# Patient Record
Sex: FEMALE | Race: WHITE | NOT HISPANIC OR LATINO | URBAN - METROPOLITAN AREA
[De-identification: names, ages, dates, MRNs, and addresses within clinical notes are randomized per-mention and may not be internally consistent; named-entity substitution may affect disease eponyms.]

---

## 2019-06-15 ENCOUNTER — INPATIENT (INPATIENT)
Facility: HOSPITAL | Age: 59
LOS: 1 days | Discharge: HOME | End: 2019-06-17
Attending: INTERNAL MEDICINE | Admitting: INTERNAL MEDICINE
Payer: COMMERCIAL

## 2019-06-15 VITALS
HEART RATE: 82 BPM | SYSTOLIC BLOOD PRESSURE: 138 MMHG | TEMPERATURE: 99 F | DIASTOLIC BLOOD PRESSURE: 98 MMHG | RESPIRATION RATE: 16 BRPM | OXYGEN SATURATION: 95 %

## 2019-06-15 DIAGNOSIS — I26.99 OTHER PULMONARY EMBOLISM WITHOUT ACUTE COR PULMONALE: ICD-10-CM

## 2019-06-15 LAB
ALBUMIN SERPL ELPH-MCNC: 4.7 G/DL — SIGNIFICANT CHANGE UP (ref 3.3–5)
ALP SERPL-CCNC: 76 U/L — SIGNIFICANT CHANGE UP (ref 40–120)
ALT FLD-CCNC: 14 U/L — SIGNIFICANT CHANGE UP (ref 4–33)
ANION GAP SERPL CALC-SCNC: 15 MMO/L — HIGH (ref 7–14)
APTT BLD: 27.5 SEC — SIGNIFICANT CHANGE UP (ref 27.5–36.3)
AST SERPL-CCNC: 16 U/L — SIGNIFICANT CHANGE UP (ref 4–32)
BASOPHILS # BLD AUTO: 0.03 K/UL — SIGNIFICANT CHANGE UP (ref 0–0.2)
BASOPHILS NFR BLD AUTO: 0.4 % — SIGNIFICANT CHANGE UP (ref 0–2)
BILIRUB SERPL-MCNC: 0.6 MG/DL — SIGNIFICANT CHANGE UP (ref 0.2–1.2)
BUN SERPL-MCNC: 10 MG/DL — SIGNIFICANT CHANGE UP (ref 7–23)
CALCIUM SERPL-MCNC: 10.3 MG/DL — SIGNIFICANT CHANGE UP (ref 8.4–10.5)
CHLORIDE SERPL-SCNC: 102 MMOL/L — SIGNIFICANT CHANGE UP (ref 98–107)
CO2 SERPL-SCNC: 24 MMOL/L — SIGNIFICANT CHANGE UP (ref 22–31)
CREAT SERPL-MCNC: 0.46 MG/DL — LOW (ref 0.5–1.3)
D DIMER BLD IA.RAPID-MCNC: 2888 NG/ML — SIGNIFICANT CHANGE UP
EOSINOPHIL # BLD AUTO: 0.1 K/UL — SIGNIFICANT CHANGE UP (ref 0–0.5)
EOSINOPHIL NFR BLD AUTO: 1.3 % — SIGNIFICANT CHANGE UP (ref 0–6)
GLUCOSE SERPL-MCNC: 124 MG/DL — HIGH (ref 70–99)
HCT VFR BLD CALC: 42.1 % — SIGNIFICANT CHANGE UP (ref 34.5–45)
HGB BLD-MCNC: 13.6 G/DL — SIGNIFICANT CHANGE UP (ref 11.5–15.5)
IMM GRANULOCYTES NFR BLD AUTO: 0.3 % — SIGNIFICANT CHANGE UP (ref 0–1.5)
INR BLD: 1.07 — SIGNIFICANT CHANGE UP (ref 0.88–1.17)
LYMPHOCYTES # BLD AUTO: 1.62 K/UL — SIGNIFICANT CHANGE UP (ref 1–3.3)
LYMPHOCYTES # BLD AUTO: 20.8 % — SIGNIFICANT CHANGE UP (ref 13–44)
MCHC RBC-ENTMCNC: 26.4 PG — LOW (ref 27–34)
MCHC RBC-ENTMCNC: 32.3 % — SIGNIFICANT CHANGE UP (ref 32–36)
MCV RBC AUTO: 81.6 FL — SIGNIFICANT CHANGE UP (ref 80–100)
MONOCYTES # BLD AUTO: 0.48 K/UL — SIGNIFICANT CHANGE UP (ref 0–0.9)
MONOCYTES NFR BLD AUTO: 6.2 % — SIGNIFICANT CHANGE UP (ref 2–14)
NEUTROPHILS # BLD AUTO: 5.53 K/UL — SIGNIFICANT CHANGE UP (ref 1.8–7.4)
NEUTROPHILS NFR BLD AUTO: 71 % — SIGNIFICANT CHANGE UP (ref 43–77)
NRBC # FLD: 0 K/UL — SIGNIFICANT CHANGE UP (ref 0–0)
NT-PROBNP SERPL-SCNC: 51.95 PG/ML — SIGNIFICANT CHANGE UP
PLATELET # BLD AUTO: 179 K/UL — SIGNIFICANT CHANGE UP (ref 150–400)
PMV BLD: 10 FL — SIGNIFICANT CHANGE UP (ref 7–13)
POTASSIUM SERPL-MCNC: 4.2 MMOL/L — SIGNIFICANT CHANGE UP (ref 3.5–5.3)
POTASSIUM SERPL-SCNC: 4.2 MMOL/L — SIGNIFICANT CHANGE UP (ref 3.5–5.3)
PROT SERPL-MCNC: 8.5 G/DL — HIGH (ref 6–8.3)
PROTHROM AB SERPL-ACNC: 12.2 SEC — SIGNIFICANT CHANGE UP (ref 9.8–13.1)
RBC # BLD: 5.16 M/UL — SIGNIFICANT CHANGE UP (ref 3.8–5.2)
RBC # FLD: 13.2 % — SIGNIFICANT CHANGE UP (ref 10.3–14.5)
SODIUM SERPL-SCNC: 141 MMOL/L — SIGNIFICANT CHANGE UP (ref 135–145)
TROPONIN T, HIGH SENSITIVITY: < 6 NG/L — SIGNIFICANT CHANGE UP (ref ?–14)
WBC # BLD: 7.78 K/UL — SIGNIFICANT CHANGE UP (ref 3.8–10.5)
WBC # FLD AUTO: 7.78 K/UL — SIGNIFICANT CHANGE UP (ref 3.8–10.5)

## 2019-06-15 PROCEDURE — 93971 EXTREMITY STUDY: CPT | Mod: 26,LT

## 2019-06-15 PROCEDURE — 71046 X-RAY EXAM CHEST 2 VIEWS: CPT | Mod: 26

## 2019-06-15 PROCEDURE — 71275 CT ANGIOGRAPHY CHEST: CPT | Mod: 26

## 2019-06-15 RX ORDER — HEPARIN SODIUM 5000 [USP'U]/ML
6500 INJECTION INTRAVENOUS; SUBCUTANEOUS ONCE
Refills: 0 | Status: COMPLETED | OUTPATIENT
Start: 2019-06-15 | End: 2019-06-15

## 2019-06-15 RX ORDER — HEPARIN SODIUM 5000 [USP'U]/ML
INJECTION INTRAVENOUS; SUBCUTANEOUS
Qty: 25000 | Refills: 0 | Status: DISCONTINUED | OUTPATIENT
Start: 2019-06-15 | End: 2019-06-17

## 2019-06-15 RX ORDER — ASPIRIN/CALCIUM CARB/MAGNESIUM 324 MG
162 TABLET ORAL ONCE
Refills: 0 | Status: COMPLETED | OUTPATIENT
Start: 2019-06-15 | End: 2019-06-15

## 2019-06-15 RX ORDER — KETOROLAC TROMETHAMINE 30 MG/ML
15 SYRINGE (ML) INJECTION ONCE
Refills: 0 | Status: DISCONTINUED | OUTPATIENT
Start: 2019-06-15 | End: 2019-06-15

## 2019-06-15 RX ORDER — HEPARIN SODIUM 5000 [USP'U]/ML
3000 INJECTION INTRAVENOUS; SUBCUTANEOUS EVERY 6 HOURS
Refills: 0 | Status: DISCONTINUED | OUTPATIENT
Start: 2019-06-15 | End: 2019-06-17

## 2019-06-15 RX ORDER — HEPARIN SODIUM 5000 [USP'U]/ML
6500 INJECTION INTRAVENOUS; SUBCUTANEOUS EVERY 6 HOURS
Refills: 0 | Status: DISCONTINUED | OUTPATIENT
Start: 2019-06-15 | End: 2019-06-17

## 2019-06-15 RX ADMIN — Medication 162 MILLIGRAM(S): at 12:28

## 2019-06-15 RX ADMIN — Medication 15 MILLIGRAM(S): at 16:43

## 2019-06-15 RX ADMIN — HEPARIN SODIUM 6500 UNIT(S): 5000 INJECTION INTRAVENOUS; SUBCUTANEOUS at 18:11

## 2019-06-15 RX ADMIN — HEPARIN SODIUM 1500 UNIT(S)/HR: 5000 INJECTION INTRAVENOUS; SUBCUTANEOUS at 18:12

## 2019-06-15 RX ADMIN — Medication 15 MILLIGRAM(S): at 13:52

## 2019-06-15 NOTE — ED ADULT NURSE REASSESSMENT NOTE - NS ED NURSE REASSESS COMMENT FT1
Pt. is alert and oriented x 4 no c/o pain no respiratory distress. Heparin iv infusing at 15ml/hr as ordered, will continue to monitor.

## 2019-06-15 NOTE — ED PROVIDER NOTE - ATTENDING CONTRIBUTION TO CARE
I, Jennifer Cabot, MD, have performed a history and physical exam of the patient and discussed their management with the resident. I reviewed the resident's note and agree with the documented findings and plan of care. My medical decision making and observations are found above.    Cabot: 59F with no PMH who comes in with 2d of R shoulder to L shoulder pain/numbness, 1 day of pleuritic chest pain, SOB, fatigue.  Had a plane flight from [facility] 2 weeks ago.  Denies F/C/N/V/D/urinary sx/cough/sputum.  on exam, HDS, NAD, MMM, eyes clear, lungs CTAB, heart sounds normal, abd soft, NT, ND, no CVAT, LEs without edema, wwp, skin normal temperature and color, neuro: alert and oriented, no focal deficits, radial pulses 2+ bilat.  Will check basic labs, trops, dimer, CXR, give ASA, reassess. I, Jennifer Cabot, MD, have performed a history and physical exam of the patient and discussed their management with the resident. I reviewed the resident's note and agree with the documented findings and plan of care. My medical decision making and observations are found above.    Cabot: 59F with no PMH who comes in with 2d of R shoulder to L shoulder pain/numbness, 1 day of pleuritic chest pain, SOB, fatigue.  Had a plane flight from [facility] 2 weeks ago.  Denies F/C/N/V/D/urinary sx/cough/sputum.  On exam, HDS, NAD, MMM, eyes clear, + TTP at the bilat trapezius muscles, no midline spine tenderness, lungs CTAB, heart sounds normal, abd soft, NT, ND, no CVAT, LEs without edema, wwp, skin normal temperature and color, neuro: alert and oriented, 5/5 strength, SILT, no focal deficits, radial pulses 2+ bilat, phalen and tinels tests normal.  Will check basic labs, trops, dimer, CXR, give ASA, reassess.

## 2019-06-15 NOTE — ED PROVIDER NOTE - PHYSICAL EXAMINATION
On exam, HDS, NAD, MMM, eyes clear, lungs CTAB, heart sounds normal, abd soft, NT, ND, no CVAT, LEs without edema, wwp, skin normal temperature and color, neuro: alert and oriented, no focal deficits, radial pulses 2+ bilat. On exam, HDS, NAD, MMM, eyes clear, + TTP at the bilat trapezius muscles, no midline spine tenderness, lungs CTAB, heart sounds normal, abd soft, NT, ND, no CVAT, LEs without edema, wwp, skin normal temperature and color, neuro: alert and oriented, 5/5 strength, SILT, no focal deficits, radial pulses 2+ bilat, phalen and tinels tests normal

## 2019-06-15 NOTE — ED PROVIDER NOTE - CLINICAL SUMMARY MEDICAL DECISION MAKING FREE TEXT BOX
Cabot: 59F with no PMH who comes in with 2d of R shoulder to L shoulder pain/numbness, 1 day of pleuritic chest pain, SOB, fatigue.  Had a plane flight from [facility] 2 weeks ago.  Denies F/C/N/V/D/urinary sx/cough/sputum.  on exam, HDS, NAD, MMM, eyes clear, lungs CTAB, heart sounds normal, abd soft, NT, ND, no CVAT, LEs without edema, wwp, skin normal temperature and color, neuro: alert and oriented, no focal deficits, radial pulses 2+ bilat.  Will check basic labs, trops, dimer, CXR, give ASA, reassess. Cabot: 59F with no PMH who comes in with 2d of R shoulder to L shoulder pain/numbness, 1 day of pleuritic chest pain, SOB, fatigue.  Had a plane flight from [facility] 2 weeks ago.  Denies F/C/N/V/D/urinary sx/cough/sputum.  On exam, HDS, NAD, MMM, eyes clear, + TTP at the bilat trapezius muscles, no midline spine tenderness, lungs CTAB, heart sounds normal, abd soft, NT, ND, no CVAT, LEs without edema, wwp, skin normal temperature and color, neuro: alert and oriented, 5/5 strength, SILT, no focal deficits, radial pulses 2+ bilat, phalen and tinels tests normal.  Will check basic labs, trops, dimer, CXR, give ASA, reassess.

## 2019-06-15 NOTE — ED PROVIDER NOTE - OBJECTIVE STATEMENT
Cabot: 59F with no PMH who comes in with 2 days of R shoulder to L shoulder pain/numbness, 1 day of pleuritic chest pain, SOB, fatigue.  Had a plane flight from [facility] 2 weeks ago.  Denies F/C/N/V/D/urinary sx/cough/sputum.  No smoking or drugs.  No FH of early heart disease.  No prior cardiac workup.

## 2019-06-15 NOTE — ED ADULT NURSE NOTE - OBJECTIVE STATEMENT
Receive pt. in room 1 alert and oriented x 4 presenting to the ER with complaints of heart palpitations, left arm pain, upper back pain. Pt. has no significant medical history and speaks Malay, son Brent at bedside and provided translation. Brent stated " my mother is here from [facility] for my wedding and for the past three days she said that she has palpitations, pain in her upper back  and left arm". Patient has no complaints of chest pain or heart palpitations at this time. Placed on cardiac monitor medicated as ordered, labs sent will continue to monitor.

## 2019-06-15 NOTE — ED ADULT TRIAGE NOTE - CHIEF COMPLAINT QUOTE
Pt from [place] 2weeks ago for wedding. Pt c/o pain in lt arm and leg then it radiated to rt arm and shoulder blades with SOB. Pt c/o numbness in lt hand that began this AM.  Pt complaining of palpitations but only complaints of shoulder pain denies chest pain. Pt denies any medical problems-but states she is very weak only up 1/2 the day before she has to rest

## 2019-06-16 DIAGNOSIS — E11.9 TYPE 2 DIABETES MELLITUS WITHOUT COMPLICATIONS: ICD-10-CM

## 2019-06-16 DIAGNOSIS — Z29.9 ENCOUNTER FOR PROPHYLACTIC MEASURES, UNSPECIFIED: ICD-10-CM

## 2019-06-16 DIAGNOSIS — I82.432 ACUTE EMBOLISM AND THROMBOSIS OF LEFT POPLITEAL VEIN: ICD-10-CM

## 2019-06-16 DIAGNOSIS — I26.99 OTHER PULMONARY EMBOLISM WITHOUT ACUTE COR PULMONALE: ICD-10-CM

## 2019-06-16 DIAGNOSIS — R09.89 OTHER SPECIFIED SYMPTOMS AND SIGNS INVOLVING THE CIRCULATORY AND RESPIRATORY SYSTEMS: ICD-10-CM

## 2019-06-16 DIAGNOSIS — R51 HEADACHE: ICD-10-CM

## 2019-06-16 LAB
ANION GAP SERPL CALC-SCNC: 13 MMO/L — SIGNIFICANT CHANGE UP (ref 7–14)
APTT BLD: 103.6 SEC — HIGH (ref 27.5–36.3)
APTT BLD: 125.7 SEC — CRITICAL HIGH (ref 27.5–36.3)
APTT BLD: 58.5 SEC — HIGH (ref 27.5–36.3)
APTT BLD: 60.7 SEC — HIGH (ref 27.5–36.3)
BUN SERPL-MCNC: 16 MG/DL — SIGNIFICANT CHANGE UP (ref 7–23)
CALCIUM SERPL-MCNC: 9.1 MG/DL — SIGNIFICANT CHANGE UP (ref 8.4–10.5)
CHLORIDE SERPL-SCNC: 103 MMOL/L — SIGNIFICANT CHANGE UP (ref 98–107)
CHOLEST SERPL-MCNC: 196 MG/DL — SIGNIFICANT CHANGE UP (ref 120–199)
CO2 SERPL-SCNC: 23 MMOL/L — SIGNIFICANT CHANGE UP (ref 22–31)
CREAT SERPL-MCNC: 0.53 MG/DL — SIGNIFICANT CHANGE UP (ref 0.5–1.3)
GLUCOSE SERPL-MCNC: 121 MG/DL — HIGH (ref 70–99)
HBA1C BLD-MCNC: 6.5 % — HIGH (ref 4–5.6)
HCT VFR BLD CALC: 37.7 % — SIGNIFICANT CHANGE UP (ref 34.5–45)
HCT VFR BLD CALC: 38.5 % — SIGNIFICANT CHANGE UP (ref 34.5–45)
HCV AB S/CO SERPL IA: 0.17 S/CO — SIGNIFICANT CHANGE UP (ref 0–0.99)
HCV AB SERPL-IMP: SIGNIFICANT CHANGE UP
HDLC SERPL-MCNC: 48 MG/DL — SIGNIFICANT CHANGE UP (ref 45–65)
HGB BLD-MCNC: 12.2 G/DL — SIGNIFICANT CHANGE UP (ref 11.5–15.5)
HGB BLD-MCNC: 12.5 G/DL — SIGNIFICANT CHANGE UP (ref 11.5–15.5)
LIPID PNL WITH DIRECT LDL SERPL: 140 MG/DL — SIGNIFICANT CHANGE UP
MAGNESIUM SERPL-MCNC: 2.2 MG/DL — SIGNIFICANT CHANGE UP (ref 1.6–2.6)
MCHC RBC-ENTMCNC: 26.9 PG — LOW (ref 27–34)
MCHC RBC-ENTMCNC: 27.2 PG — SIGNIFICANT CHANGE UP (ref 27–34)
MCHC RBC-ENTMCNC: 32.4 % — SIGNIFICANT CHANGE UP (ref 32–36)
MCHC RBC-ENTMCNC: 32.5 % — SIGNIFICANT CHANGE UP (ref 32–36)
MCV RBC AUTO: 83.2 FL — SIGNIFICANT CHANGE UP (ref 80–100)
MCV RBC AUTO: 83.7 FL — SIGNIFICANT CHANGE UP (ref 80–100)
NRBC # FLD: 0 K/UL — SIGNIFICANT CHANGE UP (ref 0–0)
NRBC # FLD: 0 K/UL — SIGNIFICANT CHANGE UP (ref 0–0)
PHOSPHATE SERPL-MCNC: 4.3 MG/DL — SIGNIFICANT CHANGE UP (ref 2.5–4.5)
PLATELET # BLD AUTO: 160 K/UL — SIGNIFICANT CHANGE UP (ref 150–400)
PLATELET # BLD AUTO: 168 K/UL — SIGNIFICANT CHANGE UP (ref 150–400)
PMV BLD: 10.3 FL — SIGNIFICANT CHANGE UP (ref 7–13)
PMV BLD: 10.6 FL — SIGNIFICANT CHANGE UP (ref 7–13)
POTASSIUM SERPL-MCNC: 3.9 MMOL/L — SIGNIFICANT CHANGE UP (ref 3.5–5.3)
POTASSIUM SERPL-SCNC: 3.9 MMOL/L — SIGNIFICANT CHANGE UP (ref 3.5–5.3)
RBC # BLD: 4.53 M/UL — SIGNIFICANT CHANGE UP (ref 3.8–5.2)
RBC # BLD: 4.6 M/UL — SIGNIFICANT CHANGE UP (ref 3.8–5.2)
RBC # FLD: 13.5 % — SIGNIFICANT CHANGE UP (ref 10.3–14.5)
RBC # FLD: 13.7 % — SIGNIFICANT CHANGE UP (ref 10.3–14.5)
SODIUM SERPL-SCNC: 139 MMOL/L — SIGNIFICANT CHANGE UP (ref 135–145)
TRIGL SERPL-MCNC: 111 MG/DL — SIGNIFICANT CHANGE UP (ref 10–149)
TSH SERPL-MCNC: 5.64 UIU/ML — HIGH (ref 0.27–4.2)
WBC # BLD: 5.75 K/UL — SIGNIFICANT CHANGE UP (ref 3.8–10.5)
WBC # BLD: 7.29 K/UL — SIGNIFICANT CHANGE UP (ref 3.8–10.5)
WBC # FLD AUTO: 5.75 K/UL — SIGNIFICANT CHANGE UP (ref 3.8–10.5)
WBC # FLD AUTO: 7.29 K/UL — SIGNIFICANT CHANGE UP (ref 3.8–10.5)

## 2019-06-16 PROCEDURE — 99223 1ST HOSP IP/OBS HIGH 75: CPT | Mod: GC

## 2019-06-16 PROCEDURE — 12345: CPT | Mod: NC

## 2019-06-16 RX ORDER — KETOROLAC TROMETHAMINE 30 MG/ML
15 SYRINGE (ML) INJECTION ONCE
Refills: 0 | Status: DISCONTINUED | OUTPATIENT
Start: 2019-06-16 | End: 2019-06-16

## 2019-06-16 RX ORDER — ACETAMINOPHEN 500 MG
650 TABLET ORAL EVERY 6 HOURS
Refills: 0 | Status: DISCONTINUED | OUTPATIENT
Start: 2019-06-16 | End: 2019-06-17

## 2019-06-16 RX ORDER — MORPHINE SULFATE 50 MG/1
1 CAPSULE, EXTENDED RELEASE ORAL EVERY 4 HOURS
Refills: 0 | Status: DISCONTINUED | OUTPATIENT
Start: 2019-06-16 | End: 2019-06-17

## 2019-06-16 RX ADMIN — HEPARIN SODIUM 1300 UNIT(S)/HR: 5000 INJECTION INTRAVENOUS; SUBCUTANEOUS at 01:49

## 2019-06-16 RX ADMIN — Medication 650 MILLIGRAM(S): at 14:22

## 2019-06-16 RX ADMIN — Medication 15 MILLIGRAM(S): at 00:26

## 2019-06-16 RX ADMIN — HEPARIN SODIUM 1100 UNIT(S)/HR: 5000 INJECTION INTRAVENOUS; SUBCUTANEOUS at 08:57

## 2019-06-16 RX ADMIN — HEPARIN SODIUM 1100 UNIT(S)/HR: 5000 INJECTION INTRAVENOUS; SUBCUTANEOUS at 15:53

## 2019-06-16 RX ADMIN — Medication 15 MILLIGRAM(S): at 00:30

## 2019-06-16 RX ADMIN — Medication 650 MILLIGRAM(S): at 15:10

## 2019-06-16 RX ADMIN — HEPARIN SODIUM 1100 UNIT(S)/HR: 5000 INJECTION INTRAVENOUS; SUBCUTANEOUS at 22:23

## 2019-06-16 NOTE — H&P ADULT - NSHPLABSRESULTS_GEN_ALL_CORE
13.6   7.78  )-----------( 179      ( 15 Romaine 2019 12:19 )             42.1   06-15    141  |  102  |  10  ----------------------------<  124<H>  4.2   |  24  |  0.46<L>    Ca    10.3      15 Romaine 2019 12:12    TPro  8.5<H>  /  Alb  4.7  /  TBili  0.6  /  DBili  x   /  AST  16  /  ALT  14  /  AlkPhos  76  06-15    EKG: NSR@75 TWI V1 with Incomplete RBBB Qtc 439     D-Dimer 2888    Trops <6    Chest Xray: IMPRESSION:  Clear lungs.    US DPLX LE b/l : IMPRESSION:  Acute deep vein thrombosis in the LEFT popliteal vein and gastrocnemius vein. Question additional thrombus in the distal left superficial femoral vein.    CT Angio Chest: IMPRESSION:   Bilateral pulmonary emboli with suspicion for right heart strain.

## 2019-06-16 NOTE — PROGRESS NOTE ADULT - ASSESSMENT
59 y.o. [place] speaking Female w/ no PMHx p/w  2 days of SOB, pleuritic cp,  B/L shoulder + subscapular pain after travel from [facility] 2 weeks ago. Patient found to have LLE DVT c/b  B/L pulmonary emboli with suspected RV strain.

## 2019-06-16 NOTE — H&P ADULT - ASSESSMENT
58 y/o Divehi speaking Female with no significant medica history and not on any medications presents with 2x day of SOB and B/L shoulder + subscapular pain. Also pleuritic chest pain that get better with repositioning.  pts son was at the bedside, who state that she arrived from [facility] 2x weeks ago. pt is being admitted for B/L pulmonary emboli .

## 2019-06-16 NOTE — H&P ADULT - RS GEN PE MLT RESP DETAILS PC
good air movement/airway patent/normal/no rales/no rhonchi/chest wall tenderness/no wheezes/clear to auscultation bilaterally/breath sounds equal

## 2019-06-16 NOTE — H&P ADULT - NEUROLOGICAL DETAILS
no spontaneous movement/responds to pain/responds to verbal commands/alert and oriented x 3/sensation intact/normal strength

## 2019-06-16 NOTE — PROGRESS NOTE ADULT - SUBJECTIVE AND OBJECTIVE BOX
Patient is a 59y old  Female who presents with a chief complaint of Acute Shortness of Breath (16 Jun 2019 00:09)      SUBJECTIVE / OVERNIGHT EVENTS:  Patient has no new complaints. Denies cp, SOB, abdominal pain, N/V/D     MEDICATIONS  (STANDING):  heparin  Infusion.  Unit(s)/Hr (15 mL/Hr) IV Continuous <Continuous>    MEDICATIONS  (PRN):  heparin  Injectable 6500 Unit(s) IV Push every 6 hours PRN For aPTT less than 40  heparin  Injectable 3000 Unit(s) IV Push every 6 hours PRN For aPTT between 40 - 57  morphine  - Injectable 1 milliGRAM(s) IV Push every 4 hours PRN Moderate Pain (4 - 6)      Vital Signs Last 24 Hrs  T(C): 36.7 (16 Jun 2019 12:22), Max: 36.7 (15 Romaine 2019 22:32)  T(F): 98.1 (16 Jun 2019 12:22), Max: 98.1 (16 Jun 2019 12:22)  HR: 70 (16 Jun 2019 12:22) (70 - 78)  BP: 112/75 (16 Jun 2019 12:22) (112/75 - 148/86)  BP(mean): --  RR: 17 (16 Jun 2019 12:22) (17 - 20)  SpO2: 99% (16 Jun 2019 12:22) (96% - 99%)  CAPILLARY BLOOD GLUCOSE        I&O's Summary      PHYSICAL EXAM:  GENERAL: NAD, well-developed  HEAD:  Atraumatic, Normocephalic  EYES: EOMI, PERRLA, conjunctiva and sclera clear  NECK: Supple, No JVD  CHEST/LUNG: Clear to auscultation bilaterally; No wheeze  HEART: Regular rate and rhythm; No murmurs, rubs, or gallops  ABDOMEN: Soft, Nontender, Nondistended; Bowel sounds present  EXTREMITIES:  2+ Peripheral Pulses, No clubbing, cyanosis, or edema  PSYCH: AAOx3  NEUROLOGY: non-focal  SKIN: No rashes or lesions    LABS:                        12.2   5.75  )-----------( 160      ( 16 Jun 2019 07:40 )             37.7     06-16    139  |  103  |  16  ----------------------------<  121<H>  3.9   |  23  |  0.53    Ca    9.1      16 Jun 2019 07:40  Phos  4.3     06-16  Mg     2.2     06-16    TPro  8.5<H>  /  Alb  4.7  /  TBili  0.6  /  DBili  x   /  AST  16  /  ALT  14  /  AlkPhos  76  06-15    PT/INR - ( 15 Romaine 2019 12:19 )   PT: 12.2 SEC;   INR: 1.07          PTT - ( 16 Jun 2019 07:40 )  PTT:103.6 SEC          RADIOLOGY & ADDITIONAL TESTS:    Imaging Personally Reviewed: < from: CT Angio Chest w/ IV Cont (06.15.19 @ 17:54) >  IMPRESSION:     Bilateral pulmonary emboli with suspicion for right heart strain.    Call Back:  I discussed this case with  at 6:48 PM on 6/15/2019.    Hospital policies for call back including read back policy were   followed. The verbal communication call back supplements this written   report.    < end of copied text >    < from: US Duplex Venous Lower Ext Ltd, Left (06.15.19 @ 15:05) >  IMPRESSION:     Acute deep vein thrombosis in the LEFT popliteal vein and gastrocnemius   vein. Question additional thrombus in the distal left superficial femoral   vein.      < end of copied text >      Consultant(s) Notes Reviewed:      Care Discussed with Consultants/Other Providers: Patient is a 59y old  Female who presents with a chief complaint of Acute Shortness of Breath (16 Jun 2019 00:09)      SUBJECTIVE / OVERNIGHT EVENTS:  Patient c/o mild HA. Denies cp, SOB, abdominal pain, N/V/D     MEDICATIONS  (STANDING):  heparin  Infusion.  Unit(s)/Hr (15 mL/Hr) IV Continuous <Continuous>    MEDICATIONS  (PRN):  heparin  Injectable 6500 Unit(s) IV Push every 6 hours PRN For aPTT less than 40  heparin  Injectable 3000 Unit(s) IV Push every 6 hours PRN For aPTT between 40 - 57  morphine  - Injectable 1 milliGRAM(s) IV Push every 4 hours PRN Moderate Pain (4 - 6)      Vital Signs Last 24 Hrs  T(C): 36.7 (16 Jun 2019 12:22), Max: 36.7 (15 Romaine 2019 22:32)  T(F): 98.1 (16 Jun 2019 12:22), Max: 98.1 (16 Jun 2019 12:22)  HR: 70 (16 Jun 2019 12:22) (70 - 78)  BP: 112/75 (16 Jun 2019 12:22) (112/75 - 148/86)  BP(mean): --  RR: 17 (16 Jun 2019 12:22) (17 - 20)  SpO2: 99% (16 Jun 2019 12:22) (96% - 99%)  CAPILLARY BLOOD GLUCOSE        I&O's Summary      PHYSICAL EXAM:  GENERAL: NAD, well-developed  HEAD:  Atraumatic, Normocephalic  EYES: EOMI, PERRLA, conjunctiva and sclera clear  NECK: Supple, No JVD  CHEST/LUNG: Clear to auscultation bilaterally; No wheeze  HEART: Regular rate and rhythm; No murmurs, rubs, or gallops  ABDOMEN: Soft, Nontender, Nondistended; Bowel sounds present  EXTREMITIES:  2+ Peripheral Pulses, No clubbing, cyanosis, or edema  PSYCH: AAOx3  NEUROLOGY: non-focal  SKIN: No rashes or lesions    LABS:                        12.2   5.75  )-----------( 160      ( 16 Jun 2019 07:40 )             37.7     06-16    139  |  103  |  16  ----------------------------<  121<H>  3.9   |  23  |  0.53    Ca    9.1      16 Jun 2019 07:40  Phos  4.3     06-16  Mg     2.2     06-16    TPro  8.5<H>  /  Alb  4.7  /  TBili  0.6  /  DBili  x   /  AST  16  /  ALT  14  /  AlkPhos  76  06-15    PT/INR - ( 15 Romaine 2019 12:19 )   PT: 12.2 SEC;   INR: 1.07          PTT - ( 16 Jun 2019 07:40 )  PTT:103.6 SEC          RADIOLOGY & ADDITIONAL TESTS:    Imaging Personally Reviewed: < from: CT Angio Chest w/ IV Cont (06.15.19 @ 17:54) >  IMPRESSION:     Bilateral pulmonary emboli with suspicion for right heart strain.    Call Back:  I discussed this case with  at 6:48 PM on 6/15/2019.    Hospital policies for call back including read back policy were   followed. The verbal communication call back supplements this written   report.    < end of copied text >    < from: US Duplex Venous Lower Ext Ltd, Left (06.15.19 @ 15:05) >  IMPRESSION:     Acute deep vein thrombosis in the LEFT popliteal vein and gastrocnemius   vein. Question additional thrombus in the distal left superficial femoral   vein.      < end of copied text >      Consultant(s) Notes Reviewed:      Care Discussed with Consultants/Other Providers:

## 2019-06-16 NOTE — H&P ADULT - MUSCULOSKELETAL
details… detailed exam ROM intact/no joint warmth/no joint erythema/normal strength/no joint swelling/no calf tenderness/normal

## 2019-06-16 NOTE — H&P ADULT - ATTENDING COMMENTS
58 yo f with likely provoked BL segmental and subsegmental PE with CT based suspicion for right heart strain. Pt hemothymically stable. C/w IV heparin with eventual. transition to oral AC.  Pain control 60 yo f with likely provoked BL segmental and subsegmental PE with CT based suspicion for right heart strain. Pt hemothymically stable. Continue IV heparin with eventual transition to oral AC.  Pain control

## 2019-06-16 NOTE — H&P ADULT - HISTORY OF PRESENT ILLNESS
58 y/o Bulgarian speaking Female with no significant medica history and not on any medications presents with 2x day of SOB and B/L shoulder + subscapular pain. Also pleuritic chest pain that get better with repositioning.  pts son was at the bedside, who state that she arrived from [facility] 2x weeks ago. pt states that she was walking uphill  and developed shortness of breath but ignored it and thought it would go away. Morning she developed b/l subscapular pain, 7/10, described as stubbing pain. pt denies chest pain, HART, PND, orthopnea, palpitations, diaphoresis, lightheadedness, dizziness, syncope, increased lower extremity edema, fever chills, malaise, myalgias, anorexia, generalized fatigue abdominal pain, N/V/C/D, melena, urinary symptoms, cough, and wheezing.

## 2019-06-16 NOTE — H&P ADULT - PROBLEM SELECTOR PLAN 1
- AM LABS   - CTA Chest with Bilateral pulmonary emboli with suspicion for right heart strain.  - Currently saturating well on NC 2L  - Pro-BNP unremarkable. High suspicion for RV strain. TTE to check RV strain  - Monitor vitals closely  - Continue heparin drip for anticoagulation  - Pain control

## 2019-06-16 NOTE — PROGRESS NOTE ADULT - PROBLEM SELECTOR PLAN 1
- CTA Chest with Bilateral pulmonary emboli with suspicion for right heart strain.  - Currently saturating well on NC 2L  - High suspicion for RV strain as per imaging so check TTE to r/o RV strain  - Continue heparin drip for anticoagulation  - consider vascular/pulmonary consult if RV strain  - Pain control

## 2019-06-17 VITALS
TEMPERATURE: 98 F | HEART RATE: 72 BPM | OXYGEN SATURATION: 100 % | DIASTOLIC BLOOD PRESSURE: 67 MMHG | SYSTOLIC BLOOD PRESSURE: 138 MMHG | RESPIRATION RATE: 18 BRPM

## 2019-06-17 LAB
APTT BLD: 56.4 SEC — HIGH (ref 27.5–36.3)
APTT BLD: 70.7 SEC — HIGH (ref 27.5–36.3)
HCT VFR BLD CALC: 37.4 % — SIGNIFICANT CHANGE UP (ref 34.5–45)
HGB BLD-MCNC: 12.4 G/DL — SIGNIFICANT CHANGE UP (ref 11.5–15.5)
MCHC RBC-ENTMCNC: 27.7 PG — SIGNIFICANT CHANGE UP (ref 27–34)
MCHC RBC-ENTMCNC: 33.2 % — SIGNIFICANT CHANGE UP (ref 32–36)
MCV RBC AUTO: 83.7 FL — SIGNIFICANT CHANGE UP (ref 80–100)
NRBC # FLD: 0 K/UL — SIGNIFICANT CHANGE UP (ref 0–0)
PLATELET # BLD AUTO: 177 K/UL — SIGNIFICANT CHANGE UP (ref 150–400)
PMV BLD: 10 FL — SIGNIFICANT CHANGE UP (ref 7–13)
RBC # BLD: 4.47 M/UL — SIGNIFICANT CHANGE UP (ref 3.8–5.2)
RBC # FLD: 14 % — SIGNIFICANT CHANGE UP (ref 10.3–14.5)
T4 FREE SERPL-MCNC: 1.04 NG/DL — SIGNIFICANT CHANGE UP (ref 0.9–1.8)
TSH SERPL-MCNC: 2.49 UIU/ML — SIGNIFICANT CHANGE UP (ref 0.27–4.2)
WBC # BLD: 5.18 K/UL — SIGNIFICANT CHANGE UP (ref 3.8–10.5)
WBC # FLD AUTO: 5.18 K/UL — SIGNIFICANT CHANGE UP (ref 3.8–10.5)

## 2019-06-17 PROCEDURE — 99239 HOSP IP/OBS DSCHRG MGMT >30: CPT

## 2019-06-17 PROCEDURE — 93306 TTE W/DOPPLER COMPLETE: CPT | Mod: 26

## 2019-06-17 RX ORDER — RIVAROXABAN 15 MG-20MG
20 KIT ORAL EVERY 24 HOURS
Refills: 0 | Status: DISCONTINUED | OUTPATIENT
Start: 2019-06-17 | End: 2019-06-17

## 2019-06-17 RX ORDER — RIVAROXABAN 15 MG-20MG
15 KIT ORAL
Refills: 0 | Status: DISCONTINUED | OUTPATIENT
Start: 2019-06-17 | End: 2019-06-17

## 2019-06-17 RX ORDER — RIVAROXABAN 15 MG-20MG
1 KIT ORAL
Qty: 1 | Refills: 0
Start: 2019-06-17 | End: 2019-07-07

## 2019-06-17 RX ADMIN — HEPARIN SODIUM 1300 UNIT(S)/HR: 5000 INJECTION INTRAVENOUS; SUBCUTANEOUS at 09:28

## 2019-06-17 RX ADMIN — RIVAROXABAN 15 MILLIGRAM(S): KIT at 18:36

## 2019-06-17 RX ADMIN — HEPARIN SODIUM 1300 UNIT(S)/HR: 5000 INJECTION INTRAVENOUS; SUBCUTANEOUS at 15:35

## 2019-06-17 NOTE — PROGRESS NOTE ADULT - ASSESSMENT
59 y.o. woman w/ no PMHx p/w  2 days of SOB, pleuritic cp,  B/L shoulder + subscapular pain after travel from [facility] 2 weeks ago. Patient found to have LLE DVT c/b  B/L pulmonary emboli. No RV strain on TTE.

## 2019-06-17 NOTE — PROGRESS NOTE ADULT - PROBLEM SELECTOR PLAN 1
- CTA Chest with Bilateral pulmonary emboli with suspicion for right heart strain. TTE without e/o RV strain.  - will wean oxygen and transition to DOAC if insurance covers this (patient is visiting from [place] and has traveler's insurance). If unable to afford DOAC, will need to bridge to warfarin.  -patient is clinically euvolemic and has warm extremities; no elevated JVD  -if can afford DOAC, can d/c on Xarelto Starter Pack, otherwise will bridge to warfarin

## 2019-06-17 NOTE — DISCHARGE NOTE PROVIDER - NSDCCPCAREPLAN_GEN_ALL_CORE_FT
PRINCIPAL DISCHARGE DIAGNOSIS  Diagnosis: Pulmonary embolism  Assessment and Plan of Treatment: Compliance with your Blood Thinner        SECONDARY DISCHARGE DIAGNOSES  Diagnosis: Type 2 diabetes mellitus without complication, without long-term current use of insulin  Assessment and Plan of Treatment: Monitor finger sticks pre-meal and bedtime, low salt, fat and carbohydrate diet, minimize glucose intake.  Exercise daily for at least 30 minutes and weight loss.  Follow up with primary care physician and endocrinologist for routine Hemoglobin A1C checks and management.  Follow up with your ophthalmologist for routine yearly vision exams.      Diagnosis: Acute deep vein thrombosis (DVT) of popliteal vein of left lower extremity  Assessment and Plan of Treatment: Compliance with Your blood Thinner

## 2019-06-17 NOTE — PROGRESS NOTE ADULT - PROBLEM SELECTOR PLAN 4
HBA1c 6.5; new diagnosis  needs diabetic education  will need diet and exercise modification  diabetic diet
TSH elevated  no prior H/O hypothyroid  repeat TFT before starting low dose levothyroxine.

## 2019-06-17 NOTE — PROGRESS NOTE ADULT - PROBLEM SELECTOR PLAN 6
Patient already therapeutically anticoagulated for DVT ppx with Heparin gtt; attempting to transition to oral agent pending insurance
described as mild and throbbing.  Try Tylenol and if worsens check CT head in setting of AC

## 2019-06-17 NOTE — PROGRESS NOTE ADULT - PROBLEM SELECTOR PLAN 5
Resolved
HBA1c 6.5; new diagnosis  needs diabetic education  will need diet and exercise modification  change to diabetic diet

## 2019-06-17 NOTE — DISCHARGE NOTE PROVIDER - HOSPITAL COURSE
59 y.o. woman w/ no PMHx p/w  2 days of SOB, pleuritic cp,  B/L shoulder + subscapular pain after travel from [facility] 2 weeks ago. Patient found to have LLE DVT c/b  B/L pulmonary emboli. No RV strain on TTE.         Problem/Plan - 1:    ·  Problem: Pulmonary embolism.  Plan: - CTA Chest with Bilateral pulmonary emboli with suspicion for right heart strain. TTE without e/o RV strain.    - will wean oxygen and transition to DOAC    -patient is clinically euvolemic and has warm extremities; no elevated JVD         Problem/Plan - 2:    ·  Problem: Acute deep vein thrombosis (DVT) of popliteal vein of left lower extremity.  Plan: most likely source of PE:     c/w  oral agent as above.          Problem/Plan - 3:    ·  Problem: R/O Hypothyroidism, unspecified type.  Plan: TSH wnl on repeat; can f/u with PMD as outpatient.          Problem/Plan - 4:    ·  Problem: Type 2 diabetes mellitus without complication, without long-term current use of insulin.  Plan: HBA1c 6.5; new diagnosis    needs diabetic education    will need diet and exercise modification    diabetic diet.          Problem/Plan - 5:    ·  Problem: Acute nonintractable headache, unspecified headache type.  Plan: Resolved.         Case  discussed with attending, Pt is stable for discharge home. 59 y.o. woman w/ no PMHx p/w  2 days of SOB, pleuritic cp,  B/L shoulder + subscapular pain after travel from [facility] 2 weeks ago. Patient found to have LLE DVT c/b  B/L pulmonary emboli. No RV strain on TTE.         Problem/Plan - 1:      Problem: Pulmonary embolism.  Plan: - CTA Chest with Bilateral pulmonary emboli with suspicion for right heart strain. TTE without e/o RV strain.    - will wean oxygen and transition to DOAC    - patient is clinically euvolemic and has warm extremities; no elevated JVD    - will need age appropriate malignancy screening as outpatient         Problem/Plan - 2:      Problem: Acute deep vein thrombosis (DVT) of popliteal vein of left lower extremity.  Plan: most likely source of PE: Pt with recent flight from [place].     c/w  oral agent as above.     outpatient malignancy screening         Problem/Plan - 3:    ·  Problem: R/O Hypothyroidism, unspecified type.  Plan: TSH wnl on repeat; can f/u with PMD as outpatient.          Problem/Plan - 4:    ·  Problem: Type 2 diabetes mellitus without complication, without long-term current use of insulin.  Plan: HBA1c 6.5; new diagnosis    needs diabetic education    will need diet and exercise modification    diabetic diet.          Problem/Plan - 5:    ·  Problem: Acute nonintractable headache, unspecified headache type.  Plan: Resolved.

## 2019-06-17 NOTE — PROGRESS NOTE ADULT - PROBLEM SELECTOR PROBLEM 5
Acute nonintractable headache, unspecified headache type
Type 2 diabetes mellitus without complication, without long-term current use of insulin

## 2019-06-17 NOTE — PROGRESS NOTE ADULT - PROBLEM SELECTOR PROBLEM 4
Type 2 diabetes mellitus without complication, without long-term current use of insulin
R/O Hypothyroidism, unspecified type

## 2019-06-17 NOTE — PROGRESS NOTE ADULT - SUBJECTIVE AND OBJECTIVE BOX
Patient is a 59y old  Female who presents with a chief complaint of Acute Shortness of Breath (16 Jun 2019 13:36)      SUBJECTIVE / OVERNIGHT EVENTS: No events o/n. Son is at bedside and is translating per patient request. Reports no SOB, cp. No dizziness or LH.     MEDICATIONS  (STANDING):  heparin  Infusion.  Unit(s)/Hr (15 mL/Hr) IV Continuous <Continuous>    MEDICATIONS  (PRN):  acetaminophen   Tablet .. 650 milliGRAM(s) Oral every 6 hours PRN Mild Pain (1 - 3), Moderate Pain (4 - 6)  heparin  Injectable 6500 Unit(s) IV Push every 6 hours PRN For aPTT less than 40  heparin  Injectable 3000 Unit(s) IV Push every 6 hours PRN For aPTT between 40 - 57  morphine  - Injectable 1 milliGRAM(s) IV Push every 4 hours PRN Severe Pain (7 - 10)      PHYSICAL EXAM:  T(C): 36.7 (06-17-19 @ 13:15), Max: 36.8 (06-16-19 @ 16:22)  HR: 72 (06-17-19 @ 13:15) (62 - 99)  BP: 138/67 (06-17-19 @ 13:15) (117/68 - 138/67)  RR: 18 (06-17-19 @ 13:15) (16 - 18)  SpO2: 100% (06-17-19 @ 13:15) (98% - 100%)  I&O's Summary    GENERAL: NAD, well-developed, lying in bed, pleasant  HEAD:  Atraumatic, Normocephalic, MMM  EYES: EOMI, PERRLA, conjunctiva and sclera clear  NECK: Supple, No elevated JVD  CHEST/LUNG: Clear to auscultation bilaterally; No wheeze  HEART: Regular rate and rhythm; No murmurs, rubs, or gallops  ABDOMEN: Soft, Nontender, Nondistended; Bowel sounds present  EXTREMITIES:  2+ Peripheral Pulses, No clubbing, cyanosis, or edema  PSYCH: AAOx3  NEUROLOGY: CN II-XII grossly intact, moving all extremities    LABS:  CAPILLARY BLOOD GLUCOSE      POCT Blood Glucose.: 120 mg/dL (17 Jun 2019 08:41)                          12.4   5.18  )-----------( 177      ( 17 Jun 2019 06:40 )             37.4     06-16    139  |  103  |  16  ----------------------------<  121<H>  3.9   |  23  |  0.53    Ca    9.1      16 Jun 2019 07:40  Phos  4.3     06-16  Mg     2.2     06-16      PTT - ( 17 Jun 2019 15:09 )  PTT:70.7 SEC          RADIOLOGY & ADDITIONAL TESTS:    Telemetry Personally Reviewed - SR, PVCs    Imaging Personally Reviewed -     Imaging Reviewed -     Consultant(s) Notes Reviewed -       Care Discussed with Consultants/Other Providers -

## 2019-06-17 NOTE — DISCHARGE NOTE NURSING/CASE MANAGEMENT/SOCIAL WORK - NSDCDPATPORTLINK_GEN_ALL_CORE
You can access the [facility], offered by [facility], by registering with the following website: [email or web address]

## 2019-07-01 NOTE — ED PROVIDER NOTE - PSH
Hi Dr Macy Huerta,    Patient called into the service and asked if you could contact her [facility] pharmacy in [place]: to her prescriptions  Patient's insurance is UTD and needs refills of medications for 3 months  Patient needs her BC filled "most Importantly "  Please call patient if you have any questions 
No significant past surgical history

## 2021-04-18 NOTE — ED ADULT TRIAGE NOTE - RELATIONSHIP TO PATIENT
Report received. Assessment complete.  AAOx4. Patient calls appropriately.  Pt denies pain. No interventions needed at this time. Will continue to monitor.   Pt denies N/V, SOB, or chest pain.  RODRIGUEZ, ambulatory x SBA.  + void, LBM 04/17  Pt is tolerating diabetic diet, appetite is poor, oral intake encouraged.   RLQ biopsy site present, covered with gauze/tegaderm, CDI    Plan of care discussed with patient. Fall precautions in place. Belongings and call light within reach. Educated patient to call for assistance as needed. All needs met at this time.    son

## 2021-12-08 NOTE — H&P ADULT - NSHPSOCIALHISTORY_GEN_ALL_CORE
pt lives in [facility], Pt denies smoking , alcohol and drug use Retention Suture Bite Size: 3 mm

## 2022-11-08 NOTE — ED ADULT NURSE NOTE - NSFALLRSKHARMRISK_ED_ALL_ED
Keep all f/u appointments as scheduled. Return to the hospital ASAP if you have decrease fetal movement; if you have constant abdominal pain that will not subside; six or more contractions an hour unrelieved by rest and hydration; think your water is broken; have bright red vaginal bleeding or any other problems or concerns. If you have any questions, call Labor and Delivery at [phone].  
no